# Patient Record
Sex: FEMALE | Race: WHITE | NOT HISPANIC OR LATINO | URBAN - METROPOLITAN AREA
[De-identification: names, ages, dates, MRNs, and addresses within clinical notes are randomized per-mention and may not be internally consistent; named-entity substitution may affect disease eponyms.]

---

## 2017-01-01 ENCOUNTER — OFFICE VISIT - HEALTHEAST (OUTPATIENT)
Dept: RHEUMATOLOGY | Facility: CLINIC | Age: 67
End: 2017-01-01

## 2017-01-01 ENCOUNTER — RECORDS - HEALTHEAST (OUTPATIENT)
Dept: ADMINISTRATIVE | Facility: OTHER | Age: 67
End: 2017-01-01

## 2017-01-01 ENCOUNTER — RECORDS - HEALTHEAST (OUTPATIENT)
Dept: GENERAL RADIOLOGY | Age: 67
End: 2017-01-01

## 2017-01-01 ENCOUNTER — AMBULATORY - HEALTHEAST (OUTPATIENT)
Dept: PODIATRY | Facility: CLINIC | Age: 67
End: 2017-01-01

## 2017-01-01 ENCOUNTER — HOSPITAL ENCOUNTER (OUTPATIENT)
Dept: MAMMOGRAPHY | Facility: HOSPITAL | Age: 67
Discharge: HOME | End: 2017-04-14
Attending: FAMILY MEDICINE

## 2017-01-01 ENCOUNTER — COMMUNICATION - HEALTHEAST (OUTPATIENT)
Dept: RHEUMATOLOGY | Facility: CLINIC | Age: 67
End: 2017-01-01

## 2017-01-01 ENCOUNTER — OFFICE VISIT - HEALTHEAST (OUTPATIENT)
Dept: ONCOLOGY | Facility: HOSPITAL | Age: 67
End: 2017-01-01

## 2017-01-01 ENCOUNTER — COMMUNICATION - HEALTHEAST (OUTPATIENT)
Dept: ONCOLOGY | Facility: HOSPITAL | Age: 67
End: 2017-01-01

## 2017-01-01 ENCOUNTER — AMBULATORY - HEALTHEAST (OUTPATIENT)
Dept: INFUSION THERAPY | Facility: HOSPITAL | Age: 67
End: 2017-01-01

## 2017-01-01 ENCOUNTER — RECORDS - HEALTHEAST (OUTPATIENT)
Dept: GENERAL RADIOLOGY | Facility: CLINIC | Age: 67
End: 2017-01-01

## 2017-01-01 ENCOUNTER — COMMUNICATION - HEALTHEAST (OUTPATIENT)
Dept: ADMINISTRATIVE | Facility: HOSPITAL | Age: 67
End: 2017-01-01

## 2017-01-01 DIAGNOSIS — M19.041 PRIMARY OSTEOARTHRITIS OF BOTH HANDS: ICD-10-CM

## 2017-01-01 DIAGNOSIS — B35.1 NAIL FUNGUS: ICD-10-CM

## 2017-01-01 DIAGNOSIS — R76.8 CYCLIC CITRULLINATED PEPTIDE (CCP) ANTIBODY POSITIVE: ICD-10-CM

## 2017-01-01 DIAGNOSIS — R76.8 RHEUMATOID FACTOR POSITIVE: ICD-10-CM

## 2017-01-01 DIAGNOSIS — M17.0 PRIMARY OSTEOARTHRITIS OF BOTH KNEES: ICD-10-CM

## 2017-01-01 DIAGNOSIS — L60.2 ONYCHAUXIS: ICD-10-CM

## 2017-01-01 DIAGNOSIS — C50.412 BREAST CANCER OF UPPER-OUTER QUADRANT OF LEFT FEMALE BREAST (H): ICD-10-CM

## 2017-01-01 DIAGNOSIS — M06.00 SERONEGATIVE RHEUMATOID ARTHRITIS (H): ICD-10-CM

## 2017-01-01 DIAGNOSIS — J84.112 IDIOPATHIC FIBROSING ALVEOLITIS (H): ICD-10-CM

## 2017-01-01 DIAGNOSIS — M25.571 PAIN IN RIGHT ANKLE AND JOINTS OF RIGHT FOOT: ICD-10-CM

## 2017-01-01 DIAGNOSIS — G89.29 CHRONIC PAIN OF LEFT KNEE: ICD-10-CM

## 2017-01-01 DIAGNOSIS — M19.042 PRIMARY OSTEOARTHRITIS OF BOTH HANDS: ICD-10-CM

## 2017-01-01 DIAGNOSIS — M05.79 SEROPOSITIVE RHEUMATOID ARTHRITIS OF MULTIPLE SITES (H): ICD-10-CM

## 2017-01-01 DIAGNOSIS — M25.562 PAIN IN LEFT KNEE: ICD-10-CM

## 2017-01-01 DIAGNOSIS — Z12.31 VISIT FOR SCREENING MAMMOGRAM: ICD-10-CM

## 2017-01-01 DIAGNOSIS — Z79.899 HIGH RISK MEDICATION USE: ICD-10-CM

## 2017-01-01 DIAGNOSIS — M25.562 CHRONIC PAIN OF LEFT KNEE: ICD-10-CM

## 2017-01-01 DIAGNOSIS — G89.29 OTHER CHRONIC PAIN: ICD-10-CM

## 2017-01-01 DIAGNOSIS — Z72.0 TOBACCO ABUSE: ICD-10-CM

## 2017-01-01 DIAGNOSIS — M06.9: ICD-10-CM

## 2017-01-01 DIAGNOSIS — M25.571 ANKLE PAIN, RIGHT: ICD-10-CM

## 2017-01-01 DIAGNOSIS — M06.00 RHEUMATOID ARTHRITIS WITHOUT RHEUMATOID FACTOR, UNSPECIFIED SITE (H): ICD-10-CM

## 2017-01-01 RX ORDER — NUTRIT SUPP/INULIN/FOS/FIBER 0.05G-1.06
LIQUID (ML) ORAL
Status: SHIPPED | COMMUNITY
Start: 2008-11-13

## 2017-01-01 RX ORDER — IBUPROFEN 800 MG/1
TABLET, FILM COATED ORAL
Refills: 1 | Status: SHIPPED | COMMUNITY
Start: 2017-01-01

## 2017-01-01 RX ORDER — HYDROXYCHLOROQUINE SULFATE 200 MG/1
TABLET, FILM COATED ORAL
Qty: 180 TABLET | Refills: 0 | Status: SHIPPED | OUTPATIENT
Start: 2017-01-01

## 2017-01-01 ASSESSMENT — MIFFLIN-ST. JEOR
SCORE: 1275.21
SCORE: 1262.51

## 2021-05-24 ENCOUNTER — RECORDS - HEALTHEAST (OUTPATIENT)
Dept: ADMINISTRATIVE | Facility: CLINIC | Age: 71
End: 2021-05-24

## 2021-05-26 ENCOUNTER — RECORDS - HEALTHEAST (OUTPATIENT)
Dept: ADMINISTRATIVE | Facility: CLINIC | Age: 71
End: 2021-05-26

## 2021-05-27 ENCOUNTER — RECORDS - HEALTHEAST (OUTPATIENT)
Dept: ADMINISTRATIVE | Facility: CLINIC | Age: 71
End: 2021-05-27

## 2021-05-28 ENCOUNTER — RECORDS - HEALTHEAST (OUTPATIENT)
Dept: ADMINISTRATIVE | Facility: CLINIC | Age: 71
End: 2021-05-28

## 2021-05-29 ENCOUNTER — RECORDS - HEALTHEAST (OUTPATIENT)
Dept: ADMINISTRATIVE | Facility: CLINIC | Age: 71
End: 2021-05-29

## 2021-05-30 ENCOUNTER — RECORDS - HEALTHEAST (OUTPATIENT)
Dept: ADMINISTRATIVE | Facility: CLINIC | Age: 71
End: 2021-05-30

## 2021-05-30 VITALS — BODY MASS INDEX: 25.82 KG/M2 | WEIGHT: 160 LBS

## 2021-05-31 ENCOUNTER — RECORDS - HEALTHEAST (OUTPATIENT)
Dept: ADMINISTRATIVE | Facility: CLINIC | Age: 71
End: 2021-05-31

## 2021-05-31 VITALS — BODY MASS INDEX: 26.28 KG/M2 | WEIGHT: 162.8 LBS

## 2021-05-31 VITALS — BODY MASS INDEX: 25.71 KG/M2 | HEIGHT: 66 IN | WEIGHT: 160 LBS

## 2021-05-31 VITALS — HEIGHT: 66 IN | BODY MASS INDEX: 26.16 KG/M2 | WEIGHT: 162.8 LBS

## 2021-06-01 ENCOUNTER — RECORDS - HEALTHEAST (OUTPATIENT)
Dept: ADMINISTRATIVE | Facility: CLINIC | Age: 71
End: 2021-06-01

## 2021-06-02 ENCOUNTER — RECORDS - HEALTHEAST (OUTPATIENT)
Dept: ADMINISTRATIVE | Facility: CLINIC | Age: 71
End: 2021-06-02

## 2021-06-03 ENCOUNTER — RECORDS - HEALTHEAST (OUTPATIENT)
Dept: ADMINISTRATIVE | Facility: CLINIC | Age: 71
End: 2021-06-03

## 2021-06-08 NOTE — PROGRESS NOTES
SUBJECTIVE FINDINGS: Ms. Gallego returned to the clinic today complaining of  thick discolored dystrophic toenails both feet      OBJECTIVE FINDINGS: Thick discolored dystrophic nails 1 through 5 both  feet. These nails are three to four times normal thickness. Subungual  debris is present. Skin of bilateral feet warm supple and intact. DP and PT pulses +2/4 bilaterally. Capillary refill less  than 2 seconds bilaterally. Negative clonus. Negative Babinski bilaterally.  Range of motion within normal limits bilaterally. Muscle power +5/5  bilaterally within all compartments.      ASSESSMENT: Onychomycosis, onychauxis.      PLAN: Debrided nails 1 through 5 both feet. I recommended the patient return to the clinic every 2-3 months for continued footcare.

## 2021-06-09 NOTE — PROGRESS NOTES
ASSESSMENT AND PLAN:  Amalia Gallego 66 y.o. female, is a for follow-up of seropositive, double, severe rheumatoid arthritis, pulmonary fibrosis, and background of pemphigus for which she is on azathioprine from dermatologist.  Her rheumatoid arthritis is remarkably well controlled on hydroxychloroquine.  This may be contributed to by azathioprine.  She has noted discomfort in her knees.  X-rays will be taken today.  Osteoarthritis management was reviewed.  Reminded her of the eye examination.  Return for follow-up in 4 months.  Diagnoses and all orders for this visit:    Seronegative rheumatoid arthritis  -     XR Knees Bilateral 1 Or 2 VWS; Future; Expected date: 3/20/17    Chronic pain of left knee  -     XR Knees Bilateral 1 Or 2 VWS; Future; Expected date: 3/20/17    High risk medication use           HISTORY OF PRESENTING ILLNESS:  Amalia Gallego 66 y.o. is here for followup of Rheumatoid Arthritis.  This is associated with autoantibodies.  This is gone on for the past several years.  The onset was gradual.  This is in the background of pulmonary fibrosis.  She has associated osteoarthritis.  Her symptoms are well controlled with hydroxychloroquine.  Patient denies associated nausea, new headache, nodules, rashes/photosensitive, Raynaud's and seizures.  Overall disease activity:  stable. Limitation on activities as noted in the MDHAQ scanned in the EMR.  Further historical information, including ROS as noted in the multidimensional health assessment questionnaire scanned in the EMR and in the assessment and plan section.  She has noted cough.  She has quit smoking from today.  She was in the hospital at [facility] for anxiety/depression related issues and has done well however a unintended consequences been that she no longer feels that she needs to smoke anymore and is working toward that goal.  Rheumatoid Arthritis Disease Activity Measure used: RAPID3, reviewed  today and scanned in the  chart.  ALLERGIES:Cephalosporins; Ciprofloxacin; Erythromycin base; and Penicillins    PAST MEDICAL/ACTIVE PROBLEMS/MEDICATION/SOCIAL DATA  Past Medical History:   Diagnosis Date     Anxiety      Breast cancer 2007?    left     Chronic allergic pneumonitis      Coronary artery disease      Depression      Diverticulitis      GERD (gastroesophageal reflux disease) 2/5/2015     Hx of radiation therapy      Hyperlipidemia      Hypothyroidism      Migraine headache      Osteoarthritis      Osteopenia      Pemphigus foliaceus      Pulmonary fibrosis      RA (rheumatoid arthritis)      History   Smoking Status     Current Every Day Smoker     Packs/day: 1.00     Years: 40.00   Smokeless Tobacco     Former User     Comment: trying to quit, .5 pack yesterday     Patient Active Problem List   Diagnosis     Breast cancer of upper-outer quadrant of left female breast     Migraine Headache     Menopause     Hypothyroidism     Hyperlipidemia     Hyperglycemia     Anxiety     Osteopenia     Pulmonary Fibrosis     Pemphigus Foliaceus     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     Allergic Rhinitis     Chronic Pneumonitis     Coronary Artery Disease     Osteoarthritis     Major depressive disorder, recurrent episode, severe, without mention of psychotic behavior     Other dysfunctions of sleep stages or arousal from sleep     Tobacco abuse     Carbuncle     Feeling Full Before Finishing Meals (Early Satiety)     Foot Pain (Soft Tissue)      Herpes simplex     Hypotension     Pre-op evaluation     Family history of coronary artery disease     Constipation     MDD (major depressive disorder), recurrent episode, moderate     Seronegative rheumatoid arthritis     Stiffness of both knees     High risk medication use     Chronic pain of left knee     Current Outpatient Prescriptions   Medication Sig Dispense Refill     alendronate (FOSAMAX) 70 MG tablet TAKE 1 TABLET ONCE WEEKLY. 13 tablet 0     aspirin 81 MG tablet Take 81 mg by  mouth every evening.        atorvastatin (LIPITOR) 10 MG tablet TAKE ONE TABLET BY MOUTH AT BEDTIME 90 tablet 1     CALCIUM CARBONATE/VITAMIN D3 (CALCIUM 600 + D,3, ORAL) Take 1 tablet by mouth 2 (two) times a day.       hydroxychloroquine (PLAQUENIL) 200 mg tablet TAKE 1 TABLET (200 MG TOTAL) BY MOUTH 2 (TWO) TIMES A DAY. 180 tablet 0     ibuprofen (ADVIL,MOTRIN) 800 MG tablet Take 1 tablet (800 mg total) by mouth every 6 (six) hours as needed for pain. 90 tablet 1     levothyroxine (SYNTHROID) 50 MCG tablet Take 50 mcg by mouth Daily at 6:00 am.       OLANZapine (ZYPREXA) 7.5 MG tablet Take 7.5 mg by mouth bedtime.        traZODone (DESYREL) 100 MG tablet Take 100 mg by mouth bedtime.        venlafaxine (EFFEXOR-XR) 150 MG 24 hr capsule   0     No current facility-administered medications for this visit.        DETAILED EXAMINATION (six area) :  Vitals:    03/20/17 1137   BP: 120/68   Pulse: 80   Weight: 160 lb (72.6 kg)     Alert oriented. Head including the face is examined for malar rash, heliotropes, scarring, lupus pernio. Eyes examined for redness such as in episcleritis/scleritis, periorbital lesions.   Neck examined  for lymph nodes, range of motion Both upper and lower extremities (all four) examined for swollen, warm &/or  tender joints, range of motion, rash, muscle weakness, edema. The salient normal / abnormal findings are appended.     She does not have synovitis in the appendicular palpable joints.  She has finger clubbing.  She has bilateral Crackles in the lungs.  Scattered PIP tenderness in several digits..    LAB / IMAGING DATA:  ALT   Date Value Ref Range Status   12/20/2016 17 0 - 45 U/L Final   09/01/2016 13 0 - 45 U/L Final   07/13/2016 13 0 - 45 U/L Final     ALBUMIN   Date Value Ref Range Status   12/20/2016 3.1 (L) 3.5 - 5.0 g/dL Final   09/01/2016 3.3 (L) 3.5 - 5.0 g/dL Final   07/13/2016 3.3 (L) 3.5 - 5.0 g/dL Final     CREATININE   Date Value Ref Range Status   12/20/2016 0.76 0.60 -  1.10 mg/dL Final   09/01/2016 0.74 0.60 - 1.10 mg/dL Final   07/13/2016 0.80 0.60 - 1.10 mg/dL Final       WBC   Date Value Ref Range Status   09/01/2016 6.6 4.0 - 11.0 thou/uL Final   07/13/2016 8.9 4.0 - 11.0 thou/uL Final   04/01/2015 5.4 4.0 - 11.0 thou/uL Final   02/19/2015 5.7 4.0 - 11.0 thou/uL Final     HEMOGLOBIN   Date Value Ref Range Status   09/01/2016 15.0 12.0 - 16.0 g/dL Final   07/13/2016 15.3 12.0 - 16.0 g/dL Final   04/11/2016 14.3 12.0 - 16.0 g/dL Final     PLATELETS   Date Value Ref Range Status   09/01/2016 316 140 - 440 thou/uL Final   07/13/2016 291 140 - 440 thou/uL Final   04/11/2016 290 140 - 440 thou/uL Final

## 2021-06-12 NOTE — PROGRESS NOTES
ASSESSMENT AND PLAN:  Amalia Gallego 67 y.o. female,  an avid golfer,  has double positive rheumatoid arthritis, pulmonary fibrosis, pemphigus.  She is on hydroxychloroquine for her rheumatoid arthritis.  She was on azathioprine from her dermatologist for pemphigus, been discontinued.  Despite this, hydroxychloroquine seems to be in enough for controlling her rheumatoid symptoms.  She has osteoarthritis of the knees.  She remains active.  Little in the way of pain.  I have asked her to return for follow-up as well as she is doing now in 6 months.  Reminded her of eye examination.  Diagnoses and all orders for this visit:    Seropositive rheumatoid arthritis of multiple sites  -     hydroxychloroquine (PLAQUENIL) 200 mg tablet; TAKE 1 TABLET (200 MG TOTAL) BY MOUTH 2 (TWO) TIMES A DAY.  Dispense: 180 tablet; Refill: 0  -     ALT (SGPT); Future; Expected date: 1/24/18  -     Albumin; Future; Expected date: 1/24/18  -     Creatinine; Future; Expected date: 1/24/18  -     HM2(CBC w/o Differential); Future; Expected date: 1/24/18  -     CCP Antibodies; Future; Expected date: 1/24/18  -     Rheumatoid Factor Quant; Future; Expected date: 1/24/18    Pulmonary Fibrosis    Primary osteoarthritis of both knees    Cyclic citrullinated peptide (CCP) antibody positive - 5/21/09    Rheumatoid factor positive - 5/21/09           HISTORY OF PRESENTING ILLNESS:  Amalia Gallego 67 y.o. is here for followup of Rheumatoid Arthritis, seropositive double.  High titer anti-CCP antibody.  She has several comorbidities including pulmonary fibrosis pemphigus.  She is doing great simply on hydroxychloroquine.  She is no longer on azathioprine.. Joints affected include multiple joints. Her arthropathy presented several years. Onset was gradual. Her rheumatoid arthritis  symptoms are stable.Symptoms included joint pain, joint swelling and morning stiffness and were of moderate severity.Symptoms are made worse by: nothing. Symptoms are helped by  current regimen.  Patient denies associated nausea, new headache, nodules, rashes/photosensitive, Raynaud's and seizures.  Overall disease activity:  stable. Limitation on activities as noted in the MDHAQ scanned in the EMR.  Further historical information, including ROS as noted in the multidimensional health assessment questionnaire scanned in the EMR and in the assessment and plan section.  Her knee pain has subsided.  Rheumatoid Arthritis Disease Activity Measure used: RAPID3, reviewed  today and scanned in the chart.  ALLERGIES:Cephalosporins; Ciprofloxacin; Erythromycin base; and Penicillins    PAST MEDICAL/ACTIVE PROBLEMS/MEDICATION/SOCIAL DATA  Past Medical History:   Diagnosis Date     Anxiety      Breast cancer 2007?    left     Chronic allergic pneumonitis      Coronary artery disease      Depression      Diverticulitis      GERD (gastroesophageal reflux disease) 2/5/2015     Hx of radiation therapy      Hyperlipidemia      Hypothyroidism      Migraine headache      Osteoarthritis      Osteopenia      Pemphigus foliaceus      Pulmonary fibrosis      RA (rheumatoid arthritis)      History   Smoking Status     Current Every Day Smoker     Packs/day: 1.00     Years: 40.00   Smokeless Tobacco     Former User     Comment: trying to quit, .5 pack yesterday     Patient Active Problem List   Diagnosis     Breast cancer of upper-outer quadrant of left female breast     Migraine Headache     Menopause     Hypothyroidism     Hyperlipidemia     Hyperglycemia     Anxiety     Osteopenia     Pulmonary Fibrosis     Pemphigus Foliaceus     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     Allergic Rhinitis     Chronic Pneumonitis     Coronary Artery Disease     Osteoarthritis     Major depressive disorder, recurrent episode, severe, without mention of psychotic behavior     Other dysfunctions of sleep stages or arousal from sleep     Tobacco abuse     Carbuncle     Feeling Full Before Finishing Meals (Early Satiety)      "Foot Pain (Soft Tissue)      Herpes simplex     Hypotension     Pre-op evaluation     Family history of coronary artery disease     Constipation     MDD (major depressive disorder), recurrent episode, moderate     Seronegative rheumatoid arthritis     Stiffness of both knees     High risk medication use     Current Outpatient Prescriptions   Medication Sig Dispense Refill     alendronate (FOSAMAX) 70 MG tablet TAKE 1 TABLET ONCE WEEKLY. 13 tablet 0     aspirin 81 MG tablet Take 81 mg by mouth every evening.        atorvastatin (LIPITOR) 10 MG tablet TAKE ONE TABLET BY MOUTH AT BEDTIME 90 tablet 1     CALCIUM CARBONATE/VITAMIN D3 (CALCIUM 600 + D,3, ORAL) Take 1 tablet by mouth 2 (two) times a day.       hydroxychloroquine (PLAQUENIL) 200 mg tablet TAKE 1 TABLET (200 MG TOTAL) BY MOUTH 2 (TWO) TIMES A DAY. 180 tablet 0     levothyroxine (SYNTHROID) 50 MCG tablet Take 50 mcg by mouth Daily at 6:00 am.       OLANZapine (ZYPREXA) 7.5 MG tablet Take 7.5 mg by mouth bedtime.        OLANZapine (ZYPREXA) 7.5 MG tablet Take 7.5 mg by mouth daily.       traZODone (DESYREL) 100 MG tablet Take 100 mg by mouth bedtime.        venlafaxine (EFFEXOR-XR) 150 MG 24 hr capsule   0     ibuprofen (ADVIL,MOTRIN) 600 MG tablet Take 1 tablet (600 mg total) by mouth every 6 (six) hours as needed for pain. 30 tablet 0     No current facility-administered medications for this visit.        DETAILED EXAMINATION  07/24/17  :  Vitals:    07/24/17 1136   BP: 98/62   Patient Site: Left Arm   Patient Position: Sitting   Cuff Size: Adult Regular   Weight: 160 lb (72.6 kg)   Height: 5' 6\" (1.676 m)     Alert oriented. Head including the face is examined for malar rash, heliotropes, scarring, lupus pernio. Eyes examined for redness such as in episcleritis/scleritis, periorbital lesions.   Neck examined  for lymph nodes, range of motion Both upper and lower extremities (all four) examined for swollen, warm &/or  tender joints, range of motion, rash, " muscle weakness, edema. The salient normal / abnormal findings are appended.    She does not have synovitis in the appendicular palpable joints.  She has finger clubbing.  She has bilateral Crackles in the lungs.  Minimal tenderness of the left index and middle finger PIPs.    LAB / IMAGING DATA:  ALT   Date Value Ref Range Status   12/20/2016 17 0 - 45 U/L Final   09/01/2016 13 0 - 45 U/L Final   07/13/2016 13 0 - 45 U/L Final     Albumin   Date Value Ref Range Status   12/20/2016 3.1 (L) 3.5 - 5.0 g/dL Final   09/01/2016 3.3 (L) 3.5 - 5.0 g/dL Final   07/13/2016 3.3 (L) 3.5 - 5.0 g/dL Final     Creatinine   Date Value Ref Range Status   12/20/2016 0.76 0.60 - 1.10 mg/dL Final   09/01/2016 0.74 0.60 - 1.10 mg/dL Final   07/13/2016 0.80 0.60 - 1.10 mg/dL Final       WBC   Date Value Ref Range Status   09/01/2016 6.6 4.0 - 11.0 thou/uL Final   07/13/2016 8.9 4.0 - 11.0 thou/uL Final   04/01/2015 5.4 4.0 - 11.0 thou/uL Final   02/19/2015 5.7 4.0 - 11.0 thou/uL Final     Hemoglobin   Date Value Ref Range Status   09/01/2016 15.0 12.0 - 16.0 g/dL Final   07/13/2016 15.3 12.0 - 16.0 g/dL Final   04/11/2016 14.3 12.0 - 16.0 g/dL Final     Platelets   Date Value Ref Range Status   09/01/2016 316 140 - 440 thou/uL Final   07/13/2016 291 140 - 440 thou/uL Final   04/11/2016 290 140 - 440 thou/uL Final       No results found for: JORGE

## 2021-06-13 NOTE — PROGRESS NOTES
[facility] Cancer Care Progress Note    Patient: Amalia Galleog  MRN: [id number]  Date of Service: 9/25/2017        Reason for visit      1. Breast cancer of upper-outer quadrant of left female breast    2. Tobacco abuse        Assessment     1. A 67 y.o.  woman, postmenopausal, with cancer of the breast, left side, stage I, Oncotype of 18, status post lumpectomy, radiation and about 5 years of Arimidex.  2. Pulmonary fibrosis.  With superimposed bronchitis  3. History of smoking but fortunately she has quit smoking in July 2017.  4. Depression.    Plan     1. RTC in a year with CT of lungs.  2. Mammogram in a March.  3. Followup with me in years' time.  4. Advised to call me if she has any other new problem      Clinical stage      Breast cancer of upper-outer quadrant of left female breast    Primary site: Breast (Left)    Clinical free text: ER+,CT+, [id number], Oncotype of 18    Clinical: Stage IA (T1b, N0, cM0) signed by Jaydon Hirsch MD on 8/31/2015  1:53 PM    Summary: Stage IA (T1b, N0, cM0)      History     Amalia Gallego is a very pleasant 67 y.o. old female with a history of breast cancer, diagnosed December 2007 located in the left side, measuring 0.6 cm in size presenting as an abnormal mammogram, completely asymptomatic and an Oncotype score of 18. Lymph nodes were negative making it a stage I disease. Subsequent to that, she got  radiation therapy and took Arimidex from February of 2008 to August 2013. She is off that.    Also has h/o lung nodules and smoking history. Just quit smoking in July 2017.    Was in [facility] due to depression and had ECT's in March 2015    Comes in today for scheduled follow up. She is doing fine.    Past Medical History     Past Medical History:   Diagnosis Date     Anxiety      Breast cancer 2007?    left     Chronic allergic pneumonitis      Coronary artery disease      Depression      Diverticulitis      GERD (gastroesophageal reflux disease) 2/5/2015     Hx of radiation  therapy      Hyperlipidemia      Hypothyroidism      Migraine headache      Osteoarthritis      Osteopenia      Pemphigus foliaceus      Pulmonary fibrosis      RA (rheumatoid arthritis)        Review of Systems   Constitutional  Constitutional (WDL): All constitutional elements are within defined limits  Neurosensory  Neurosensory (WDL): All neurosensory elements are within defined limits  Cardiovascular  Cardiovascular (WDL): All cardiovascular elements are within defined limits  Pulmonary  Respiratory (WDL): Exceptions to WDL  Cough: Moderate symptoms, medical intervention indicated, limiting instrumental ADL (clear phlem)  Dyspnea: Shortness of breath at rest, limiting self care ADL  Hypoxia: Decreased oxygen saturation with exercise (e.g., pulse oximeter <88%), intermittent supplemental oxygen  Gastrointestinal  Gastrointestinal (WDL): Exceptions to WDL  Anorexia: Loss of appetite without alteration in eating habits (2 ensure daily)  Genitourinary  Genitourinary (WDL): All genitourinary elements are within defined limits  Integumentary  Integumentary (WDL): All integumentary elements are within defined limits  Patient Coping  Patient Coping: Accepting  Accompanied by  Accompanied by: Alone    ECOG performance status and Distress Assessment      ECOG Performance:    ECOG Performance Status: 1    Distress Assessment  Distress Assessment Score: No distress:     Pain Status  Currently in Pain: Yes        Vital Signs     Vitals:    09/25/17 1327   BP: 114/66   Pulse: 87   Temp: 97.6  F (36.4  C)   SpO2: 90%       Physical Exam     GENERAL: No acute distress. Cooperative in conversation.   HEENT: Pupils are equal, round and reactive. Oral mucosa is clean and intact. No ulcerations or mucositis noted. No bleeding noted.  RESP:Chest symmetric lungs have crackles bilaterally per auscultation. Regular respiratory rate.   CV: Normal S1 S2 Regular, rate and rhythm. No murmurs.  ABD: Nondistended, soft, nontender. Positive  bowel sounds. No organomegaly.   EXTREMITIES: No lower extremity edema.   NEURO: Non- focal. Alert and oriented x3.  Cranial nerves appear intact.  PSYCH: Within normal limits. No depression or anxiety.  SKIN: Warm dry intact.    LYMPH NODES: Bilateral cervical, supraclavicular, axillary lymph node examination was done.  Negative for any palpable adenopathy.  BREAST: Right breast normal without mass, skin or nipple changes or axillary nodes. Left breast has surgical scar at 11 oclock position not far from nipple. Slight induration around it. Otherwise no lump or mass. No nipple discharge.    Lab Results     Results for orders placed or performed in visit on 09/25/17   HM1 (CBC with Diff)   Result Value Ref Range    WBC 7.8 4.0 - 11.0 thou/uL    RBC 4.94 3.80 - 5.40 mill/uL    Hemoglobin 14.5 12.0 - 16.0 g/dL    Hematocrit 42.5 35.0 - 47.0 %    MCV 86 80 - 100 fL    MCH 29.4 27.0 - 34.0 pg    MCHC 34.1 32.0 - 36.0 g/dL    RDW 13.2 11.0 - 14.5 %    Platelets 308 140 - 440 thou/uL    MPV 9.1 8.5 - 12.5 fL    Neutrophils % 69 50 - 70 %    Lymphocytes % 19 (L) 20 - 40 %    Monocytes % 7 2 - 10 %    Eosinophils % 5 0 - 6 %    Basophils % 1 0 - 2 %    Neutrophils Absolute 5.4 2.0 - 7.7 thou/uL    Lymphocytes Absolute 1.5 0.8 - 4.4 thou/uL    Monocytes Absolute 0.5 0.0 - 0.9 thou/uL    Eosinophils Absolute 0.4 0.0 - 0.4 thou/uL    Basophils Absolute 0.1 0.0 - 0.2 thou/uL          Imaging Results     No results found.      Jaydon Hirsch MD

## 2021-06-13 NOTE — PROGRESS NOTES
SUBJECTIVE FINDINGS: Ms. Gallego returned to the clinic today complaining of  thick discolored dystrophic toenails both feet.  The patient also complained of a sharp pain involving the outer portion of her right ankle.  She has had this pain for approximately 6 weeks.  She is finding it difficult to ambulate because of the pain.  She has to alter her gait to avoid the pain.  She does not recall any trauma to the right ankle.  She has not had any associated redness or swelling.      OBJECTIVE FINDINGS: Thick discolored dystrophic nails 1 through 5 both  feet. These nails are three to four times normal thickness. Subungual  debris is present. Skin of bilateral feet warm supple and intact. DP and PT pulses +2/4 bilaterally. Capillary refill less  than 2 seconds bilaterally. Negative clonus. Negative Babinski bilaterally.  Range of motion within normal limits bilaterally. Muscle power +5/5  bilaterally within all compartments.  There is good range of motion at the right ankle.  No crepitus on range of motion right ankle.  There is no edema or erythema noted right ankle.  There is no pain on palpation of the lateral aspect of the right ankle.      ASSESSMENT: Right ankle pain, onychomycosis, onychauxis.      PLAN: An x-ray of the right ankle was taken today.  I informed the patient that the ankle was negative for any osseous or soft tissue abnormalities.  The patient was placed on ibuprofen 600 mg 1 tab 3 times daily.  I informed the patient that she should improve.  If she continues to have pain over the next several weeks I will recommend partial immobilization.  Debrided nails 1 through 5 both feet. I recommended the patient return to the clinic every 2-3 months for continued footcare.

## 2021-06-14 NOTE — PROGRESS NOTES
"ASSESSMENT AND PLAN:  Amalia Gallego 67 y.o. female, who is an avid golfer is here for follow-up of rheumatoid arthritis double positive, background of pulmonary fibrosis, pemphigus.  She is on hydroxychloroquine for her rheumatoid arthritis.  She used to get azathioprine from dermatologist for her pemphigus.  This is been discontinued.  She has inflammation of her right ankle joint tibiotalar.  She would like to proceed local injection.  20 mg of methylprednisolone injected under ultrasound guidance into the right tibiotalar joint.  She had a marked Marcaine effect.  She is to return for follow-up here in 3 months or sooner.    Diagnoses and all orders for this visit:    Seronegative rheumatoid arthritis    Rheumatoid factor positive - 5/21/09    Rheumatoid arthritis involving ankle  -     methylPREDNISolone acetate injection 20 mg (DEPO-MEDROL); Inject 0.5 mL (20 mg total) into the joint once.    Cyclic citrullinated peptide (CCP) antibody positive - 5/21/09           HISTORY OF PRESENTING ILLNESS:  Amalia Gallego 67 y.o. is here for followup of Rheumatoid Arthritis, seropositive double.  High titer anti-CCP antibody.  She is reporting increasing pain this is going on for the past several weeks.  This is on the right foot.  She points to the tibiotalar area.  She was seen at her podiatrist.  Evidently x-ray was taken.  She was told that there is \"a lot of arthritis\".  No further treatment evidently was forthcoming.  She reports that this is interfering with her day-to-day activities.  She has prolonged stiffness in the morning.  She rates the pain as moderately severe to severe.  She has not had a fall.  There is no rash.  She has several comorbidities including pulmonary fibrosis pemphigus.  She is doing great simply on hydroxychloroquine.  She is no longer on azathioprine.. Joints affected include multiple joints. Her arthropathy presented several years. Onset was gradual. Her rheumatoid arthritis  symptoms are " stable.Symptoms included joint pain, joint swelling and morning stiffness and were of moderate severity.Symptoms are made worse by: nothing. Symptoms are helped by current regimen.  Patient denies associated nausea, new headache, nodules, rashes/photosensitive, Raynaud's and seizures.  Overall disease activity:  stable. Limitation on activities as noted in the MDHAQ scanned in the EMR.  Further historical information, including ROS as noted in the multidimensional health assessment questionnaire scanned in the EMR and in the assessment and plan section.  Her knee pain has subsided.  Rheumatoid Arthritis Disease Activity Measure used: RAPID3, reviewed  today and scanned in the chart.  ALLERGIES:Cephalosporins; Ciprofloxacin; Erythromycin base; and Penicillins    PAST MEDICAL/ACTIVE PROBLEMS/MEDICATION/SOCIAL DATA  Past Medical History:   Diagnosis Date     Anxiety      Breast cancer 2007?    left     Chronic allergic pneumonitis      Coronary artery disease      Depression      Diverticulitis      GERD (gastroesophageal reflux disease) 2/5/2015     Hx of radiation therapy      Hyperlipidemia      Hypothyroidism      Migraine headache      Osteoarthritis      Osteopenia      Pemphigus foliaceus      Pulmonary fibrosis      RA (rheumatoid arthritis)      History   Smoking Status     Former Smoker     Packs/day: 1.00     Years: 40.00     Quit date: 7/15/2017   Smokeless Tobacco     Former User     Comment: trying to quit, .5 pack yesterday     Patient Active Problem List   Diagnosis     Breast cancer of upper-outer quadrant of left female breast     Migraine Headache     Menopause     Hypothyroidism     Hyperlipidemia     Hyperglycemia     Anxiety     Osteopenia     Pulmonary Fibrosis     Pemphigus Foliaceus     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     Allergic Rhinitis     Chronic Pneumonitis     Coronary Artery Disease     Osteoarthritis     Major depressive disorder, recurrent episode, severe, without  "mention of psychotic behavior     Other dysfunctions of sleep stages or arousal from sleep     Tobacco abuse     Carbuncle     Feeling Full Before Finishing Meals (Early Satiety)     Foot Pain (Soft Tissue)      Herpes simplex     Hypotension     Pre-op evaluation     Family history of coronary artery disease     Constipation     MDD (major depressive disorder), recurrent episode, moderate     Seronegative rheumatoid arthritis     Stiffness of both knees     High risk medication use     Cyclic citrullinated peptide (CCP) antibody positive - 5/21/09     Rheumatoid factor positive - 5/21/09     Current Outpatient Prescriptions   Medication Sig Dispense Refill     alendronate (FOSAMAX) 70 MG tablet TAKE 1 TABLET ONCE WEEKLY. 13 tablet 0     aspirin 81 MG tablet Take 81 mg by mouth every evening.        atorvastatin (LIPITOR) 10 MG tablet TAKE ONE TABLET BY MOUTH AT BEDTIME 90 tablet 1     CALCIUM CARBONATE/VITAMIN D3 (CALCIUM 600 + D,3, ORAL) Take 1 tablet by mouth 2 (two) times a day.       food supplemt, lactose-reduced (ENSURE) Liqd Take by mouth.       hydroxychloroquine (PLAQUENIL) 200 mg tablet TAKE 1 TABLET (200 MG TOTAL) BY MOUTH 2 (TWO) TIMES A DAY. 180 tablet 0     ibuprofen (ADVIL,MOTRIN) 800 MG tablet TAKE 1 TABLET (800 MG TOTAL) BY MOUTH EVERY 6 (SIX) HOURS AS NEEDED FOR PAIN.  1     levothyroxine (SYNTHROID) 50 MCG tablet Take 50 mcg by mouth Daily at 6:00 am.       OLANZapine (ZYPREXA) 7.5 MG tablet Take 7.5 mg by mouth bedtime.        traZODone (DESYREL) 100 MG tablet Take 100 mg by mouth bedtime.        venlafaxine (EFFEXOR-XR) 150 MG 24 hr capsule Take 150 mg by mouth daily.   0     ibuprofen (ADVIL,MOTRIN) 600 MG tablet Take 1 tablet (600 mg total) by mouth every 6 (six) hours as needed for pain. 30 tablet 0     No current facility-administered medications for this visit.      DETAILED EXAMINATION  11/14/17  :  Vitals:    11/14/17 1332   BP: 120/80   Weight: 162 lb 12.8 oz (73.8 kg)   Height: 5' 6\" " (1.676 m)     Alert oriented. Head including the face is examined for malar rash, heliotropes, scarring, lupus pernio. Eyes examined for redness such as in episcleritis/scleritis, periorbital lesions.   Neck/ Face examined for parotid gland swelling, range of motion of neck.  Left upper and lower and right upper and lower extremities examined for tenderness, swelling, warmth of the appendicular joints, range of motion, edema, rash.  Some of the important findings included: She has synovitis of the right ankle.  She has finger clubbing.  Bilateral crackles in the lungs.  She does not have synovitis in the rest of the palpable appendicular joints.      LAB / IMAGING DATA:  ALT   Date Value Ref Range Status   09/25/2017 25 0 - 45 U/L Final   12/20/2016 17 0 - 45 U/L Final   09/01/2016 13 0 - 45 U/L Final     Albumin   Date Value Ref Range Status   09/25/2017 3.2 (L) 3.5 - 5.0 g/dL Final   12/20/2016 3.1 (L) 3.5 - 5.0 g/dL Final   09/01/2016 3.3 (L) 3.5 - 5.0 g/dL Final     Creatinine   Date Value Ref Range Status   09/25/2017 0.85 0.60 - 1.10 mg/dL Final   12/20/2016 0.76 0.60 - 1.10 mg/dL Final   09/01/2016 0.74 0.60 - 1.10 mg/dL Final       WBC   Date Value Ref Range Status   09/25/2017 7.8 4.0 - 11.0 thou/uL Final   09/01/2016 6.6 4.0 - 11.0 thou/uL Final   04/01/2015 5.4 4.0 - 11.0 thou/uL Final   02/19/2015 5.7 4.0 - 11.0 thou/uL Final     Hemoglobin   Date Value Ref Range Status   09/25/2017 14.5 12.0 - 16.0 g/dL Final   09/01/2016 15.0 12.0 - 16.0 g/dL Final   07/13/2016 15.3 12.0 - 16.0 g/dL Final     Platelets   Date Value Ref Range Status   09/25/2017 308 140 - 440 thou/uL Final   09/01/2016 316 140 - 440 thou/uL Final   07/13/2016 291 140 - 440 thou/uL Final       No results found for: JORGE

## 2021-06-16 PROBLEM — R76.8 CYCLIC CITRULLINATED PEPTIDE (CCP) ANTIBODY POSITIVE: Status: ACTIVE | Noted: 2017-01-01

## 2021-06-16 PROBLEM — M06.9: Status: ACTIVE | Noted: 2017-01-01

## 2021-06-16 PROBLEM — R76.8 RHEUMATOID FACTOR POSITIVE: Status: ACTIVE | Noted: 2017-01-01

## 2021-07-13 ENCOUNTER — RECORDS - HEALTHEAST (OUTPATIENT)
Dept: ADMINISTRATIVE | Facility: CLINIC | Age: 71
End: 2021-07-13

## 2021-07-21 ENCOUNTER — RECORDS - HEALTHEAST (OUTPATIENT)
Dept: ADMINISTRATIVE | Facility: CLINIC | Age: 71
End: 2021-07-21